# Patient Record
Sex: FEMALE | Race: WHITE | Employment: PART TIME | URBAN - METROPOLITAN AREA
[De-identification: names, ages, dates, MRNs, and addresses within clinical notes are randomized per-mention and may not be internally consistent; named-entity substitution may affect disease eponyms.]

---

## 2020-12-09 VITALS
SYSTOLIC BLOOD PRESSURE: 120 MMHG | HEART RATE: 80 BPM | OXYGEN SATURATION: 100 % | TEMPERATURE: 97.4 F | RESPIRATION RATE: 16 BRPM | DIASTOLIC BLOOD PRESSURE: 79 MMHG | HEIGHT: 62 IN | WEIGHT: 143 LBS | BODY MASS INDEX: 26.31 KG/M2

## 2020-12-10 ENCOUNTER — HOSPITAL ENCOUNTER (EMERGENCY)
Age: 25
Discharge: LEFT BEFORE DISCHARGE | End: 2020-12-10
Payer: COMMERCIAL

## 2020-12-11 ENCOUNTER — APPOINTMENT (OUTPATIENT)
Dept: ULTRASOUND IMAGING | Age: 25
End: 2020-12-11
Payer: COMMERCIAL

## 2020-12-11 ENCOUNTER — HOSPITAL ENCOUNTER (EMERGENCY)
Age: 25
Discharge: HOME | End: 2020-12-11
Attending: EMERGENCY MEDICINE
Payer: COMMERCIAL

## 2020-12-11 VITALS
RESPIRATION RATE: 18 BRPM | DIASTOLIC BLOOD PRESSURE: 65 MMHG | TEMPERATURE: 98.6 F | WEIGHT: 143 LBS | OXYGEN SATURATION: 100 % | SYSTOLIC BLOOD PRESSURE: 122 MMHG | BODY MASS INDEX: 26.31 KG/M2 | HEART RATE: 87 BPM | HEIGHT: 62 IN

## 2020-12-11 LAB
A/G RATIO: 1.6 (ref 1.1–2.2)
ABO/RH: NORMAL
ALBUMIN SERPL-MCNC: 4.9 G/DL (ref 3.4–5)
ALP BLD-CCNC: 60 U/L (ref 40–129)
ALT SERPL-CCNC: 14 U/L (ref 10–40)
ANION GAP SERPL CALCULATED.3IONS-SCNC: 9 MMOL/L (ref 3–16)
AST SERPL-CCNC: 13 U/L (ref 15–37)
BACTERIA WET PREP: NORMAL
BASOPHILS ABSOLUTE: 0.1 K/UL (ref 0–0.2)
BASOPHILS RELATIVE PERCENT: 0.6 %
BILIRUB SERPL-MCNC: 0.3 MG/DL (ref 0–1)
BILIRUBIN URINE: NEGATIVE
BLOOD, URINE: ABNORMAL
BUN BLDV-MCNC: 8 MG/DL (ref 7–20)
CALCIUM SERPL-MCNC: 9.7 MG/DL (ref 8.3–10.6)
CHLORIDE BLD-SCNC: 103 MMOL/L (ref 99–110)
CLARITY: CLEAR
CLUE CELLS: NORMAL
CO2: 25 MMOL/L (ref 21–32)
COLOR: YELLOW
CREAT SERPL-MCNC: 0.6 MG/DL (ref 0.6–1.1)
EOSINOPHILS ABSOLUTE: 0.4 K/UL (ref 0–0.6)
EOSINOPHILS RELATIVE PERCENT: 4 %
EPITHELIAL CELLS WET PREP: NORMAL
EPITHELIAL CELLS, UA: ABNORMAL /HPF (ref 0–5)
GFR AFRICAN AMERICAN: >60
GFR NON-AFRICAN AMERICAN: >60
GLOBULIN: 3 G/DL
GLUCOSE BLD-MCNC: 93 MG/DL (ref 70–99)
GLUCOSE URINE: NEGATIVE MG/DL
GONADOTROPIN, CHORIONIC (HCG) QUANT: 8203 MIU/ML
HCT VFR BLD CALC: 40.1 % (ref 36–48)
HEMOGLOBIN: 13.9 G/DL (ref 12–16)
KETONES, URINE: NEGATIVE MG/DL
LEUKOCYTE ESTERASE, URINE: ABNORMAL
LYMPHOCYTES ABSOLUTE: 2.2 K/UL (ref 1–5.1)
LYMPHOCYTES RELATIVE PERCENT: 20.1 %
MCH RBC QN AUTO: 30.1 PG (ref 26–34)
MCHC RBC AUTO-ENTMCNC: 34.7 G/DL (ref 31–36)
MCV RBC AUTO: 86.8 FL (ref 80–100)
MICROSCOPIC EXAMINATION: YES
MONOCYTES ABSOLUTE: 0.6 K/UL (ref 0–1.3)
MONOCYTES RELATIVE PERCENT: 5.4 %
NEUTROPHILS ABSOLUTE: 7.6 K/UL (ref 1.7–7.7)
NEUTROPHILS RELATIVE PERCENT: 69.9 %
NITRITE, URINE: NEGATIVE
PDW BLD-RTO: 12.7 % (ref 12.4–15.4)
PH UA: 7 (ref 5–8)
PLATELET # BLD: 329 K/UL (ref 135–450)
PMV BLD AUTO: 7.3 FL (ref 5–10.5)
POTASSIUM SERPL-SCNC: 3.7 MMOL/L (ref 3.5–5.1)
PROTEIN UA: 30 MG/DL
RBC # BLD: 4.62 M/UL (ref 4–5.2)
RBC UA: ABNORMAL /HPF (ref 0–4)
RBC WET PREP: NORMAL
SODIUM BLD-SCNC: 137 MMOL/L (ref 136–145)
SOURCE WET PREP: NORMAL
SPECIFIC GRAVITY UA: 1.02 (ref 1–1.03)
TOTAL PROTEIN: 7.9 G/DL (ref 6.4–8.2)
TRICHOMONAS PREP: NORMAL
URINE TYPE: ABNORMAL
UROBILINOGEN, URINE: 0.2 E.U./DL
WBC # BLD: 10.8 K/UL (ref 4–11)
WBC UA: ABNORMAL /HPF (ref 0–5)
WBC WET PREP: NORMAL
YEAST WET PREP: NORMAL

## 2020-12-11 PROCEDURE — 87491 CHLMYD TRACH DNA AMP PROBE: CPT

## 2020-12-11 PROCEDURE — 76817 TRANSVAGINAL US OBSTETRIC: CPT

## 2020-12-11 PROCEDURE — 87591 N.GONORRHOEAE DNA AMP PROB: CPT

## 2020-12-11 PROCEDURE — 87210 SMEAR WET MOUNT SALINE/INK: CPT

## 2020-12-11 PROCEDURE — 99283 EMERGENCY DEPT VISIT LOW MDM: CPT

## 2020-12-11 PROCEDURE — 81001 URINALYSIS AUTO W/SCOPE: CPT

## 2020-12-11 PROCEDURE — 85025 COMPLETE CBC W/AUTO DIFF WBC: CPT

## 2020-12-11 PROCEDURE — 80053 COMPREHEN METABOLIC PANEL: CPT

## 2020-12-11 PROCEDURE — 86900 BLOOD TYPING SEROLOGIC ABO: CPT

## 2020-12-11 PROCEDURE — 86901 BLOOD TYPING SEROLOGIC RH(D): CPT

## 2020-12-11 PROCEDURE — 84702 CHORIONIC GONADOTROPIN TEST: CPT

## 2020-12-11 ASSESSMENT — ENCOUNTER SYMPTOMS
TROUBLE SWALLOWING: 0
STRIDOR: 0
ABDOMINAL PAIN: 1
VOMITING: 0
RHINORRHEA: 0
NAUSEA: 0
SORE THROAT: 0
WHEEZING: 0
COUGH: 0
DIARRHEA: 0
SHORTNESS OF BREATH: 0
CHEST TIGHTNESS: 0

## 2020-12-11 ASSESSMENT — PAIN DESCRIPTION - DESCRIPTORS: DESCRIPTORS: CRAMPING

## 2020-12-11 ASSESSMENT — PAIN DESCRIPTION - LOCATION: LOCATION: ABDOMEN

## 2020-12-11 ASSESSMENT — PAIN DESCRIPTION - PAIN TYPE: TYPE: ACUTE PAIN

## 2020-12-11 ASSESSMENT — PAIN SCALES - GENERAL: PAINLEVEL_OUTOF10: 4

## 2020-12-11 NOTE — ED PROVIDER NOTES
[facility]  ED  EMERGENCYDEPARTMENT ENCOUNTER      Pt Name: Kelsey Allen  MRN: [id number]  [place] 1995  Date of evaluation: 2020  Sofia Aguilar MD    CHIEF COMPLAINT       Chief Complaint   Patient presents with    Vaginal Bleeding     Pt is 6.5 weeks pregnant bleeding started x 2 days ago. didn't bleed yesterday. states it started again today with cramping and blood clots. pt has a f/u appt with OB on Tuesday.  Abdominal Cramping         HISTORY OF PRESENT ILLNESS   (Location/Symptom, Timing/Onset,Context/Setting, Quality, Duration, Modifying Factors, Severity)  Note limiting factors. Kelsey Allen is a 31 y.o. female  @ LMP 10/25 (who presents to the emergency department for vaginal bleed. Patient states she started having vaginal bleeding on Wednesday, though it wasn't a lot, did not have any bleeding yesterday and noticed more bleeding today with clots, soaking through the pad she has on. States she called her ob gyn and was told to go to the ED as she has not yet established care with their office (has first appointment for Tuesday). Denies nausea, vomiting, lightheadedness, dizziness. Reports abdominal cramping. Denies vaginal discharge, dysuria, urinary frequency. HPI    Nursing Notes were reviewed. REVIEW OF SYSTEMS    (2-9 systems for level 4, 10 or more for level 5)     Review of Systems   Constitutional: Negative for activity change, appetite change, diaphoresis and fever. HENT: Negative for congestion, rhinorrhea, sore throat and trouble swallowing. Respiratory: Negative for cough, chest tightness, shortness of breath, wheezing and stridor. Cardiovascular: Negative for chest pain and palpitations. Gastrointestinal: Positive for abdominal pain. Negative for diarrhea, nausea and vomiting. Genitourinary: Positive for vaginal bleeding. Negative for dysuria, flank pain and vaginal discharge. Skin: Negative for rash and wound. Neurological: Negative for dizziness, light-headedness, numbness and headaches. Except as noted above the remainder of the review of systems was reviewedand negative. PAST MEDICAL HISTORY   History reviewed. No pertinent past medical history. SURGICAL HISTORY     History reviewed. No pertinent surgical history. CURRENT MEDICATIONS       There are no discharge medications for this patient. ALLERGIES     Patient has no known allergies. FAMILY HISTORY     History reviewed. No pertinent family history.        SOCIAL HISTORY       Social History     Socioeconomic History    Marital status: Unknown     Spouse name: None    Number of children: None    Years of education: None    Highest education level: None   Occupational History    None   Social Needs    Financial resource strain: None    Food insecurity     Worry: None     Inability: None    Transportation needs     Medical: None     Non-medical: None   Tobacco Use    Smoking status: Never Smoker    Smokeless tobacco: Never Used   Substance and Sexual Activity    Alcohol use: Yes     Comment: rarely    Drug use: Never    Sexual activity: Yes     Partners: Male   Lifestyle    Physical activity     Days per week: None     Minutes per session: None    Stress: None   Relationships    Social connections     Talks on phone: None     Gets together: None     Attends Advent service: None     Active member of club or organization: None     Attends meetings of clubs or organizations: None     Relationship status: None    Intimate partner violence     Fear of current or ex partner: None     Emotionally abused: None     Physically abused: None     Forced sexual activity: None   Other Topics Concern    None   Social History Narrative    None       SCREENINGS             PHYSICAL EXAM    (up to 7 for level 4, 8 ormore for level 5)     ED Triage Vitals   BP Temp Temp Source Pulse Resp SpO2 Height Weight   12/11/20 1351 12/11/20 1351 12/11/20 1351 12/11/20 1337 12/11/20 1351 12/11/20 1337 12/11/20 1351 12/11/20 1351   110/70 98.2 °F (36.8 °C) Oral 94 14 98 % 5' 2\" (1.575 m) 143 lb (64.9 kg)       Physical Exam  Exam conducted with a chaperone present. Constitutional:       General: She is not in acute distress. Appearance: Normal appearance. She is well-developed. She is not ill-appearing or toxic-appearing. Comments: Sitting in bed comfortably, speaking in full sentences, following verbal commands appropriately. Not in acute distress     HENT:      Head: Normocephalic and atraumatic. Eyes:      Conjunctiva/sclera: Conjunctivae normal.      Pupils: Pupils are equal, round, and reactive to light. Neck:      Musculoskeletal: Normal range of motion and neck supple. Cardiovascular:      Rate and Rhythm: Normal rate and regular rhythm. Heart sounds: Normal heart sounds. No murmur. No friction rub. No gallop. Pulmonary:      Effort: Pulmonary effort is normal. No respiratory distress. Breath sounds: Normal breath sounds. No decreased breath sounds, wheezing, rhonchi or rales. Abdominal:      General: Bowel sounds are normal. There is no distension. Palpations: Abdomen is soft. Tenderness: There is generalized abdominal tenderness. There is no guarding or rebound. Genitourinary:     Vagina: Bleeding present. Cervix: Cervical bleeding present. Comments: os fingertip open  Musculoskeletal: Normal range of motion. General: No tenderness or deformity. Skin:     General: Skin is warm and dry. Findings: No rash. Neurological:      Mental Status: She is alert and oriented to person, place, and time. GCS: GCS eye subscore is 4. GCS verbal subscore is 5. GCS motor subscore is 6. Psychiatric:         Behavior: Behavior is cooperative.          DIAGNOSTIC RESULTS     EKG: All EKG's are interpreted by the Emergency Department Physicianwho either signs or Co-signs this chart in the absence of a cardiologist.      RADIOLOGY:   Non-plain film images such as CT, Ultrasound and MRI are read by the radiologist. Plain radiographic images are visualized and preliminarily interpreted by the emergency physician with the below findings:      Interpretation per the Radiologist below, if available at the time of this note:    US OB TRANSVAGINAL   Preliminary Result   1. Single early intrauterine pregnancy at approximately 6 weeks, 0 days with   an PRAVEEN of 08/06/2021. A fetal pole is identified. A fetal heart rate is not   identified at this time. Close clinical follow up and serial beta HCG   measurements are recommended. 2. Normal color Doppler evaluation of the ovaries. ED BEDSIDE ULTRASOUND:   Performed by ED Physician - none    LABS:  Labs Reviewed   URINALYSIS - Abnormal; Notable for the following components:       Result Value    Blood, Urine LARGE (*)     Protein, UA 30 (*)     Leukocyte Esterase, Urine TRACE (*)     All other components within normal limits    Narrative:     Performed at:  [place] [address] [employer]   Phone [phone]   COMPREHENSIVE METABOLIC PANEL - Abnormal; Notable for the following components:    AST 13 (*)     All other components within normal limits    Narrative:     Performed at:  [place] [address] [employer]   Phone [phone]   MICROSCOPIC URINALYSIS - Abnormal; Notable for the following components:    RBC, UA 11-20 (*)     All other components within normal limits    Narrative:     Performed at:  [address], [facility] [employer]   Phone [phone], GENITAL    Narrative:     Performed at:  [place] [address] [employer]   Phone [phone]   C. TRACHOMATIS N.GONORRHOEAE DNA   CBC WITH AUTO DIFFERENTIAL    Narrative: Performed at:  [facility]  [address] [place], [place] [address], [place] [employer]   Phone [phone]   HCG, QUANTITATIVE, PREGNANCY    Narrative:     Performed at:  [facility]) [address],  [place], [place] [email or web address] [place]   Phone [phone]   ABO/RH    Narrative:     Performed at:  [address],  [place], [place] [employer]   Phone [phone]       All other labs were within normal range ornot returned as of this dictation. EMERGENCY DEPARTMENT COURSE and DIFFERENTIAL DIAGNOSIS/MDM:   Vitals:    Vitals:    20 1337 20 1351 20 1737 20 1853   BP:  110/70 120/78 122/65   Pulse: 94 81 76 87   Resp:  14 14 18   Temp:  98.2 °F (36.8 °C)  98.6 °F (37 °C)   TempSrc:  Oral  Oral   SpO2: 98% 100% 100% 100%   Weight:  143 lb (64.9 kg)     Height:  5' 2\" (1.575 m)           MDM    ED COURSE/MDM    -Umang Elliott is a 31 y.o. female  who presents ED for vaginal bleed in the setting of pregnancy. Patient's LMP is 10/25/2020 (6 weeks and 5 days). -Upon arrival patient was afebrile with stable vitals, BP of 110/70.  -Lab work was significant for hCG quant of 8200.  -Vaginal ultrasound: Single early intrauterine pregnancy at approximately 6 weeks 0 days with an PRAVEEN of 2021. Fetal pole is identified. Fetal heart rate is not identified at this time. Close clinical follow-up and serial beta hCG measurements are recommended. Normal color Doppler evaluation of the ovaries. -wet Prep: Negative negative  -Labs and imaging reviewed and results discussed with patient. discharge home, with order to get a repeat beta in 2 days. Patient has a follow-up appointment on Tuesday at which point she will likely get another beta and possible repeat ultrasound. Directed her on pelvic rest and instructions on return for any new or worsening symptoms.    Patient in agreement withplan, verbally confirm understanding

## 2020-12-13 ENCOUNTER — HOSPITAL ENCOUNTER (OUTPATIENT)
Age: 25
Discharge: HOME | End: 2020-12-13
Payer: COMMERCIAL

## 2020-12-13 LAB — GONADOTROPIN, CHORIONIC (HCG) QUANT: 7603 MIU/ML

## 2020-12-13 PROCEDURE — 84702 CHORIONIC GONADOTROPIN TEST: CPT

## 2020-12-13 PROCEDURE — 36415 COLL VENOUS BLD VENIPUNCTURE: CPT

## 2020-12-14 ENCOUNTER — TELEPHONE (OUTPATIENT)
Dept: OBGYN CLINIC | Age: 25
End: 2020-12-14

## 2020-12-14 LAB
C TRACH DNA GENITAL QL NAA+PROBE: NEGATIVE
N. GONORRHOEAE DNA: NEGATIVE

## 2020-12-15 ENCOUNTER — OFFICE VISIT (OUTPATIENT)
Dept: OBGYN CLINIC | Age: 25
End: 2020-12-15
Payer: COMMERCIAL

## 2020-12-15 VITALS
WEIGHT: 142.6 LBS | DIASTOLIC BLOOD PRESSURE: 60 MMHG | BODY MASS INDEX: 26.08 KG/M2 | TEMPERATURE: 97.9 F | SYSTOLIC BLOOD PRESSURE: 116 MMHG | HEART RATE: 90 BPM

## 2020-12-15 LAB
ABDOMINAL CIRCUMFERENCE: NORMAL
BIPARIETAL DIAMETER: NORMAL
ESTIMATED FETAL WEIGHT: NORMAL
FEMORAL DIAMETER: NORMAL
HC/AC: NORMAL
HEAD CIRCUMFERENCE: NORMAL

## 2020-12-15 PROCEDURE — 1036F TOBACCO NON-USER: CPT | Performed by: OBSTETRICS & GYNECOLOGY

## 2020-12-15 PROCEDURE — 99203 OFFICE O/P NEW LOW 30 MIN: CPT | Performed by: OBSTETRICS & GYNECOLOGY

## 2020-12-15 PROCEDURE — G8427 DOCREV CUR MEDS BY ELIG CLIN: HCPCS | Performed by: OBSTETRICS & GYNECOLOGY

## 2020-12-15 PROCEDURE — G8419 CALC BMI OUT NRM PARAM NOF/U: HCPCS | Performed by: OBSTETRICS & GYNECOLOGY

## 2020-12-15 PROCEDURE — G8484 FLU IMMUNIZE NO ADMIN: HCPCS | Performed by: OBSTETRICS & GYNECOLOGY

## 2020-12-15 PROCEDURE — 76817 TRANSVAGINAL US OBSTETRIC: CPT | Performed by: OBSTETRICS & GYNECOLOGY

## 2020-12-15 RX ORDER — HYDROCODONE BITARTRATE AND ACETAMINOPHEN 5; 325 MG/1; MG/1
1 TABLET ORAL EVERY 6 HOURS PRN
Qty: 4 TABLET | Refills: 0 | Status: SHIPPED | OUTPATIENT
Start: 2020-12-15 | End: 2020-12-16

## 2020-12-15 RX ORDER — MISOPROSTOL 200 UG/1
800 TABLET ORAL ONCE
Qty: 8 TABLET | Refills: 0 | Status: SHIPPED | OUTPATIENT
Start: 2020-12-15 | End: 2021-04-13 | Stop reason: ALTCHOICE

## 2020-12-15 NOTE — PROGRESS NOTES
New GYN Visit      CC:   Chief Complaint   Patient presents with    Amenorrhea     Pos home preg test lmp 10/25/20       HPI:  31 y.o. [id number] presents for ER follow-up. Was seen in [facility] 12/10/2020 for vaginal bleeding early in pregnancy. Had a small blood clot, left without being seen. Came to [facility] ER 2020 after the bleeding got heavier. At it's heaviest was saturating a pad in 2 hours. Now just changing every 3-4 hours and they are not completely saturated, no big clots. Having some lower abdominal cramping. No fevers, chills, nausea or vomiting,    LMP 10/25/2020 --> Had a Mirena removed in September, had one period and then got pregnant so unsure if cycles were regular. Review of Systems:   Review of Systems   Constitutional: Negative for chills and fever. Respiratory: Negative for shortness of breath. Cardiovascular: Negative for chest pain. Gastrointestinal: Positive for abdominal pain. Negative for constipation, diarrhea, nausea and vomiting. Genitourinary: Positive for menstrual problem and vaginal bleeding. Negative for difficulty urinating, dysuria and vaginal discharge. Neurological: Negative for dizziness, light-headedness and headaches. Obstetric History  OB History    Para Term  AB Living   3 2 2     2   SAB TAB Ectopic Molar Multiple Live Births             2      # Outcome Date GA Lbr Pranav/2nd Weight Sex Delivery Anes PTL Lv   3 Current            2 Term 06/20/15   7 lb 2.4 oz (3.243 kg) M Vag-Spont   LEWIS   1 Term 10/23/13   7 lb 1.9 oz (3.23 kg) M Vag-Spont   LEWIS       Gynecologic History  Menstrual History:   LMP: 10/25/2020   Menstrual pattern: see above  Sexual History:   Contraception: annabel   Currently is sexually active   Denies history of STIs   no sexual problems  Pap History:   Last pap smear: 2020, normal per pt report   History of abnormal pap smears: denies    Medical History:  History reviewed.  No pertinent past medical history. Surgical History:  History reviewed. No pertinent surgical history. Medications:  Current Outpatient Medications   Medication Sig Dispense Refill    Prenatal Multivit-Min-Fe-FA (PRENATAL 1 + IRON PO) Take 1 tablet by mouth daily       No current facility-administered medications for this visit.         Allergies:  No Known Allergies    Social History:  Social History     Socioeconomic History    Marital status:      Spouse name: None    Number of children: None    Years of education: None    Highest education level: None   Occupational History    None   Social Needs    Financial resource strain: None    Food insecurity     Worry: None     Inability: None    Transportation needs     Medical: None     Non-medical: None   Tobacco Use    Smoking status: Never Smoker    Smokeless tobacco: Never Used   Substance and Sexual Activity    Alcohol use: Yes     Comment: rarely    Drug use: Never    Sexual activity: Yes     Partners: Male   Lifestyle    Physical activity     Days per week: None     Minutes per session: None    Stress: None   Relationships    Social connections     Talks on phone: None     Gets together: None     Attends Church service: None     Active member of club or organization: None     Attends meetings of clubs or organizations: None     Relationship status: None    Intimate partner violence     Fear of current or ex partner: None     Emotionally abused: None     Physically abused: None     Forced sexual activity: None   Other Topics Concern    None   Social History Narrative    None       Family History:  Family History   Problem Relation Age of Onset    Cancer Paternal Grandfather     Diabetes Maternal Grandmother        Objective:  /60 (Site: Left Upper Arm, Position: Sitting, Cuff Size: Medium Adult)   Pulse 90   Temp 97.9 °F (36.6 °C) (Infrared)   Wt 142 lb 9.6 oz (64.7 kg)   LMP 10/25/2020 (Exact Date)   BMI 26.08 kg/m²     Exam:  Physical Exam  Constitutional:       Appearance: Normal appearance. HENT:      Head: Normocephalic. Cardiovascular:      Rate and Rhythm: Normal rate. Pulmonary:      Effort: Pulmonary effort is normal. No respiratory distress. Abdominal:      General: Abdomen is flat. There is no distension. Palpations: Abdomen is soft. Tenderness: There is no abdominal tenderness. There is no guarding or rebound. Genitourinary:     Comments: Pelvic exam: VULVA: normal appearing vulva with no masses, tenderness or lesions, VAGINA: normal appearing vagina with normal color and discharge, no lesions, small amount of blood in vault, CERVIX: normal cervix, small amount of bloody discharge at os, on BME external os dilated 1cm, internal closed, UTERUS: uterus is normal size, shape, consistency and nontender, ADNEXA: normal adnexa in size, nontender and no masses. Skin:     General: Skin is warm and dry. Neurological:      General: No focal deficit present. Mental Status: She is alert. Psychiatric:         Mood and Affect: Mood normal.       Labs: BHC () -> 7603 ()  GCCT neg  O+  Hgb 13.9    Ultrasound:  Impression   OBSTETRIC ULTRASOUND--1ST TRIMESTER       DATE: 12/15/20       PHYSICIAN: Ksenia Van M.D.        SONOGRAPHER: Zac Pedraza       INDICATION: follow up        COMPARISION: 20       TYPE OF SCAN:  vaginal       FINDINGS:         The cul de sac is normal.  The cervix is normal.  The uterus is gravid. No uterine anomalies are evident.        The right ovary is present. The right ovary measures 2.70 cm x 2.51 cm x 1.64 cm.         The left ovary is present. The left ovary measures 3.99 cm x 2.28 cm x 2.42 cm.         There is a single intrauterine pregnancy identified.  A fetal pole is noted with a CRL measuring 0.33 cm, consistent with gestational age of 6weeks and [de-identified]. There is no change in fetal size compared to prior imaging.  Yolk sac is present and measures 0.64 cm.   Fetal cardiac activity is absent.        IMPRESSION:     Single IUP without cardiac activity or fetal growth.       Imaging is limited secondary to bowel gas. Patient is well aware of the limitations of ultrasound in the detection of anomalies.             Assessment/Plan:  31 y.o. [id number] presenting for ER follow-up:    1. Missed   Patient with single IUP as above, no interval growth since US  and still with absence of cardiac activity. Given this along with downtrending bHCG discussed diagnosis of likely missed AB. Options reviewed including expectant, medical, and surgical management. Pt desires medication. Reviewed use of cytotec, bleeding and pain precautions, and all questions answered. Rx for norco for pain given as well.   - Rh+, no rhogam indicated  - f/u in 2 weeks if has bleeding after cytotec, otherwise sooner to discuss alternate management options    Dispo: 2 weeks or sooner pending response to medication  Ismael Kolb MD

## 2020-12-15 NOTE — RESULT ENCOUNTER NOTE
Please inform patient of need to make follow up appointment with OB/GYN based on repeat beta-HCG results this week if possible. You can let her know repeat lab was concerning.

## 2020-12-16 ASSESSMENT — ENCOUNTER SYMPTOMS
NAUSEA: 0
VOMITING: 0
ABDOMINAL PAIN: 1
SHORTNESS OF BREATH: 0
DIARRHEA: 0
CONSTIPATION: 0

## 2020-12-28 ENCOUNTER — TELEPHONE (OUTPATIENT)
Dept: OBGYN CLINIC | Age: 25
End: 2020-12-28

## 2020-12-29 ENCOUNTER — OFFICE VISIT (OUTPATIENT)
Dept: OBGYN CLINIC | Age: 25
End: 2020-12-29
Payer: COMMERCIAL

## 2020-12-29 VITALS
BODY MASS INDEX: 26.34 KG/M2 | SYSTOLIC BLOOD PRESSURE: 128 MMHG | HEART RATE: 90 BPM | DIASTOLIC BLOOD PRESSURE: 74 MMHG | WEIGHT: 144 LBS | TEMPERATURE: 98.5 F

## 2020-12-29 LAB
CONTROL: NORMAL
PREGNANCY TEST URINE, POC: NEGATIVE

## 2020-12-29 PROCEDURE — G8484 FLU IMMUNIZE NO ADMIN: HCPCS | Performed by: OBSTETRICS & GYNECOLOGY

## 2020-12-29 PROCEDURE — 1036F TOBACCO NON-USER: CPT | Performed by: OBSTETRICS & GYNECOLOGY

## 2020-12-29 PROCEDURE — 81025 URINE PREGNANCY TEST: CPT | Performed by: OBSTETRICS & GYNECOLOGY

## 2020-12-29 PROCEDURE — 99212 OFFICE O/P EST SF 10 MIN: CPT | Performed by: OBSTETRICS & GYNECOLOGY

## 2020-12-29 PROCEDURE — G8419 CALC BMI OUT NRM PARAM NOF/U: HCPCS | Performed by: OBSTETRICS & GYNECOLOGY

## 2020-12-29 PROCEDURE — G8427 DOCREV CUR MEDS BY ELIG CLIN: HCPCS | Performed by: OBSTETRICS & GYNECOLOGY

## 2020-12-29 NOTE — PROGRESS NOTES
Return Gyn Office Visit    CC:   Chief Complaint   Patient presents with    Follow-up       HPI:  31 y.o. E3P6244 who presents to office for follow-up. Was seen on 12/15 with missed AB. Took cytotec that day, worked that night after 1 dose, says she passed a lot of tissue and stopped bleeding completely around 20. No bleeding or cramping since then. Denies any chest pain, shortness of breadth. Doing well overall emotionally as well. Objective:  /74 (Site: Left Upper Arm, Position: Sitting, Cuff Size: Medium Adult)   Pulse 90   Temp 98.5 °F (36.9 °C) (Infrared)   Wt 144 lb (65.3 kg)   LMP 10/25/2020 (Exact Date)   BMI 26.34 kg/m²   General: Alert, well appearing, no acute distress  CV: well perfused, RRR  Resp: nonlabored, CTAB  Abdomen: Soft, nontender, nondistended   Ext: no LE edema or tenderness    Labs:   : O+/ab-  : UPT neg      Assessment/Plan  1. SAB (spontaneous )  Patient with missed ab now s/p cytotec and passage of POC. UPT negative today, says bleeding has resolved and no residual pain.   - Plan to wait for 1 regular menstrual cycle and then OK to attempt to conceive again  - Rh+, no rhogam indicated    Dispo: PRN or for annual exam  Aure Cho MD

## 2021-03-08 ENCOUNTER — TELEPHONE (OUTPATIENT)
Dept: OBGYN CLINIC | Age: 26
End: 2021-03-08

## 2021-03-09 ENCOUNTER — OFFICE VISIT (OUTPATIENT)
Dept: OBGYN CLINIC | Age: 26
End: 2021-03-09
Payer: COMMERCIAL

## 2021-03-09 VITALS
BODY MASS INDEX: 26.01 KG/M2 | HEART RATE: 95 BPM | SYSTOLIC BLOOD PRESSURE: 110 MMHG | WEIGHT: 142.2 LBS | TEMPERATURE: 98.3 F | DIASTOLIC BLOOD PRESSURE: 74 MMHG

## 2021-03-09 DIAGNOSIS — N91.2 AMENORRHEA: Primary | ICD-10-CM

## 2021-03-09 DIAGNOSIS — Z20.2 POSSIBLE EXPOSURE TO STD: ICD-10-CM

## 2021-03-09 LAB
BACTERIA: ABNORMAL /HPF
BILIRUBIN URINE: NEGATIVE
BLOOD, URINE: NEGATIVE
CLARITY: CLEAR
COLOR: YELLOW
CONTROL: ABNORMAL
CRL: NORMAL
EPITHELIAL CELLS, UA: 2 /HPF (ref 0–5)
GLUCOSE URINE: NEGATIVE MG/DL
HYALINE CASTS: 1 /LPF (ref 0–8)
KETONES, URINE: NEGATIVE MG/DL
LEUKOCYTE ESTERASE, URINE: ABNORMAL
MICROSCOPIC EXAMINATION: YES
NITRITE, URINE: NEGATIVE
PH UA: 6 (ref 5–8)
PREGNANCY TEST URINE, POC: POSITIVE
PROTEIN UA: NEGATIVE MG/DL
RBC UA: 2 /HPF (ref 0–4)
SAC DIAMETER: NORMAL
SPECIFIC GRAVITY UA: 1.02 (ref 1–1.03)
URINE TYPE: ABNORMAL
UROBILINOGEN, URINE: 0.2 E.U./DL
WBC UA: 4 /HPF (ref 0–5)

## 2021-03-09 PROCEDURE — 76801 OB US < 14 WKS SINGLE FETUS: CPT | Performed by: OBSTETRICS & GYNECOLOGY

## 2021-03-09 PROCEDURE — G8419 CALC BMI OUT NRM PARAM NOF/U: HCPCS | Performed by: OBSTETRICS & GYNECOLOGY

## 2021-03-09 PROCEDURE — 1036F TOBACCO NON-USER: CPT | Performed by: OBSTETRICS & GYNECOLOGY

## 2021-03-09 PROCEDURE — 99213 OFFICE O/P EST LOW 20 MIN: CPT | Performed by: OBSTETRICS & GYNECOLOGY

## 2021-03-09 PROCEDURE — 81025 URINE PREGNANCY TEST: CPT | Performed by: OBSTETRICS & GYNECOLOGY

## 2021-03-09 PROCEDURE — G8484 FLU IMMUNIZE NO ADMIN: HCPCS | Performed by: OBSTETRICS & GYNECOLOGY

## 2021-03-09 PROCEDURE — G8427 DOCREV CUR MEDS BY ELIG CLIN: HCPCS | Performed by: OBSTETRICS & GYNECOLOGY

## 2021-03-09 NOTE — PROGRESS NOTES
Return Gyn Office Visit    CC:   Chief Complaint   Patient presents with    Amenorrhea     lmp 21       HPI:  31 y.o. [id number] who presents to office for amenorrhea. LMP 21, had a miscarriage in 2020 and had 1 period after that and got pregnant with next cycle. Has been feeling well, had some light spotting in the beginning of February. Feeling very nauseated since then, no bleeding since early February. Has also been feeling very tired. Using preggy pops, those seem to help. Declines additional medication at this time. OBHx:  OB History    Para Term  AB Living   3 2 2   1 2   SAB TAB Ectopic Molar Multiple Live Births   1         2      # Outcome Date GA Lbr Pranav/2nd Weight Sex Delivery Anes PTL Lv   3 SAB            2 Term 06/20/15   7 lb 2.4 oz (3.243 kg) M Vag-Spont   LEWIS   1 Term 10/23/13   7 lb 1.9 oz (3.23 kg) M Vag-Spont   LEWIS       PMH:  History reviewed. No pertinent past medical history. PSH:  History reviewed. No pertinent surgical history. Current Outpatient Medications on File Prior to Visit   Medication Sig Dispense Refill    Prenatal Multivit-Min-Fe-FA (PRENATAL 1 + IRON PO) Take 1 tablet by mouth daily      miSOPROStol (CYTOTEC) 200 MCG tablet Place 4 tablets vaginally once for 1 dose . If no bleeding repeat after 24 hours. 8 tablet 0     No current facility-administered medications on file prior to visit. Objective:  /74 (Site: Right Upper Arm, Position: Sitting, Cuff Size: Medium Adult)   Pulse 95   Temp 98.3 °F (36.8 °C) (Infrared)   Wt 142 lb 3.2 oz (64.5 kg)   LMP 2021 (Exact Date)   BMI 26.01 kg/m²   Physical Exam  Constitutional:       Appearance: Normal appearance. HENT:      Head: Normocephalic. Cardiovascular:      Rate and Rhythm: Normal rate and regular rhythm. Pulmonary:      Effort: Pulmonary effort is normal. No respiratory distress. Abdominal:      General: Abdomen is flat. Palpations: Abdomen is soft. Amenorrhea  Patient with amenorrhea, US today shows single IUP with cardiac activity at 8+4 with final PRAVEEN 10/15/2021 by L=8wk US.  - Discussed routine prenatal care, early pregnancy precautions, continued use of PNV  - Below labs sent  - Briefly reviewed options for genetic screening including cffDNA, 1st trimester screen with NT/SAMIRA-A, and MQS in 2nd trimester. Plan for [id number] at next visit. - discussed options for treatment of n/v of pregnancy, pt to call if desires rx for additional medications before next appointment    - C.trachomatis N.gonorrhoeae DNA  - VAGINAL PATHOGENS PROBE *A  - Culture, Urine  - URINALYSIS WITH MICROSCOPIC  - POCT urine pregnancy    2.  Possible exposure to STD  - C.trachomatis N.gonorrhoeae DNA  - VAGINAL PATHOGENS PROBE *A      Dispo: 4 weeks for IPV  Gwendolyn Vuong MD

## 2021-03-10 LAB
C TRACH DNA GENITAL QL NAA+PROBE: NEGATIVE
CANDIDA SPECIES, DNA PROBE: ABNORMAL
GARDNERELLA VAGINALIS, DNA PROBE: ABNORMAL
N. GONORRHOEAE DNA: NEGATIVE
TRICHOMONAS VAGINALIS DNA: ABNORMAL
URINE CULTURE, ROUTINE: NORMAL

## 2021-03-11 RX ORDER — METRONIDAZOLE 7.5 MG/G
1 GEL VAGINAL DAILY
Qty: 1 TUBE | Refills: 0 | Status: SHIPPED | OUTPATIENT
Start: 2021-03-11 | End: 2021-03-16

## 2021-04-12 ENCOUNTER — TELEPHONE (OUTPATIENT)
Dept: OBGYN CLINIC | Age: 26
End: 2021-04-12

## 2021-04-13 ENCOUNTER — INITIAL PRENATAL (OUTPATIENT)
Dept: OBGYN CLINIC | Age: 26
End: 2021-04-13
Payer: COMMERCIAL

## 2021-04-13 VITALS
BODY MASS INDEX: 26.52 KG/M2 | DIASTOLIC BLOOD PRESSURE: 70 MMHG | SYSTOLIC BLOOD PRESSURE: 112 MMHG | HEART RATE: 88 BPM | WEIGHT: 145 LBS

## 2021-04-13 DIAGNOSIS — Z34.91 PRENATAL CARE IN FIRST TRIMESTER: Primary | ICD-10-CM

## 2021-04-13 PROCEDURE — G8427 DOCREV CUR MEDS BY ELIG CLIN: HCPCS | Performed by: OBSTETRICS & GYNECOLOGY

## 2021-04-13 PROCEDURE — 1036F TOBACCO NON-USER: CPT | Performed by: OBSTETRICS & GYNECOLOGY

## 2021-04-13 PROCEDURE — 36415 COLL VENOUS BLD VENIPUNCTURE: CPT | Performed by: OBSTETRICS & GYNECOLOGY

## 2021-04-13 PROCEDURE — G8419 CALC BMI OUT NRM PARAM NOF/U: HCPCS | Performed by: OBSTETRICS & GYNECOLOGY

## 2021-04-13 PROCEDURE — 99213 OFFICE O/P EST LOW 20 MIN: CPT | Performed by: OBSTETRICS & GYNECOLOGY

## 2021-04-13 NOTE — PROGRESS NOTES
Temp-98F infrared  Maternal emotional well being screening form completed and reviewed with patient. Current score is 5. Patient given referral to [address] [place] [address] ([phone]):  No

## 2021-04-13 NOTE — PROGRESS NOTES
Initial OB Office Visit    CC:   Chief Complaint   Patient presents with    Initial Prenatal Visit       HPI:  Pt seen and examined. No concerns/complaints. Has been doing well overall. Feeling tired. Denies VB, no cramps. Some mild discharge. No fetal movmenet yet. No nausea or vomiting. +heartburn. Maternal wellness questionnaire reviewed - no concerns today. Score 5.      Objective:  /70   Pulse 88   Wt 145 lb (65.8 kg)   LMP 01/08/2021 (Exact Date)   BMI 26.52 kg/m²   Gen: AO, NAD  Abd: Soft, NT  FHT: 157    Assessment/Plan:  31 y.o. [id number] at 13w4d (Estimated Date of Delivery: 10/15/21) presents for ROLO appointment:     Problem List Items Addressed This Visit        Gynecology/Obstetric Problems    Prenatal care in first trimester - Primary     - FWB: reassuring by janie today  - Genetic screening: [id number] sent today  - Anatomy scan: plan at 20 weeks  - Tdap: 28 weeks  - PNL: sent today, GCCT/trich neg, UCx no growth         Relevant Orders    Drug Screen Multi Urine With Bup    HIV-1 AND HIV-2 ANTIBODIES    RUBELLA ANTIBODY, IGG    Syphilis Antibody Cascading Reflex    HEPATITIS B SURFACE ANTIGEN    CBC WITH AUTO DIFFERENTIAL    VARICELLA ZOSTER ANTIBODY, IGG    TYPE AND SCREEN          Dispo: RTC in 4 weeks  Amairani Wilhelm MD

## 2021-04-14 LAB
ABO/RH: NORMAL
AMPHETAMINE SCREEN, URINE: NORMAL
ANTIBODY SCREEN: NORMAL
BARBITURATE SCREEN URINE: NORMAL
BASOPHILS ABSOLUTE: 0.1 K/UL (ref 0–0.2)
BASOPHILS RELATIVE PERCENT: 1.1 %
BENZODIAZEPINE SCREEN, URINE: NORMAL
BUPRENORPHINE URINE: NORMAL
CANNABINOID SCREEN URINE: NORMAL
COCAINE METABOLITE SCREEN URINE: NORMAL
EOSINOPHILS ABSOLUTE: 0.3 K/UL (ref 0–0.6)
EOSINOPHILS RELATIVE PERCENT: 4.3 %
HCT VFR BLD CALC: 37.9 % (ref 36–48)
HEMOGLOBIN: 13.1 G/DL (ref 12–16)
HEPATITIS B SURFACE ANTIGEN INTERPRETATION: NORMAL
HIV AG/AB: NORMAL
HIV ANTIGEN: NORMAL
HIV-1 ANTIBODY: NORMAL
HIV-2 AB: NORMAL
LYMPHOCYTES ABSOLUTE: 1.6 K/UL (ref 1–5.1)
LYMPHOCYTES RELATIVE PERCENT: 19.7 %
Lab: NORMAL
MCH RBC QN AUTO: 30.1 PG (ref 26–34)
MCHC RBC AUTO-ENTMCNC: 34.5 G/DL (ref 31–36)
MCV RBC AUTO: 87.1 FL (ref 80–100)
METHADONE SCREEN, URINE: NORMAL
MONOCYTES ABSOLUTE: 0.4 K/UL (ref 0–1.3)
MONOCYTES RELATIVE PERCENT: 5.5 %
NEUTROPHILS ABSOLUTE: 5.5 K/UL (ref 1.7–7.7)
NEUTROPHILS RELATIVE PERCENT: 69.4 %
OPIATE SCREEN URINE: NORMAL
OXYCODONE URINE: NORMAL
PDW BLD-RTO: 13.5 % (ref 12.4–15.4)
PH UA: 7
PHENCYCLIDINE SCREEN URINE: NORMAL
PLATELET # BLD: 250 K/UL (ref 135–450)
PMV BLD AUTO: 8.3 FL (ref 5–10.5)
PROPOXYPHENE SCREEN: NORMAL
RBC # BLD: 4.35 M/UL (ref 4–5.2)
RUBELLA ANTIBODY IGG: 20.5 IU/ML
TOTAL SYPHILLIS IGG/IGM: NORMAL
VARICELLA-ZOSTER VIRUS AB, IGG: NORMAL
WBC # BLD: 7.9 K/UL (ref 4–11)

## 2021-05-12 ENCOUNTER — ROUTINE PRENATAL (OUTPATIENT)
Dept: OBGYN CLINIC | Age: 26
End: 2021-05-12
Payer: COMMERCIAL

## 2021-05-12 VITALS
WEIGHT: 144.4 LBS | SYSTOLIC BLOOD PRESSURE: 120 MMHG | DIASTOLIC BLOOD PRESSURE: 70 MMHG | HEART RATE: 90 BPM | BODY MASS INDEX: 26.41 KG/M2

## 2021-05-12 DIAGNOSIS — Z34.92 PRENATAL CARE IN SECOND TRIMESTER: Primary | ICD-10-CM

## 2021-05-12 PROCEDURE — G8427 DOCREV CUR MEDS BY ELIG CLIN: HCPCS | Performed by: OBSTETRICS & GYNECOLOGY

## 2021-05-12 PROCEDURE — 99213 OFFICE O/P EST LOW 20 MIN: CPT | Performed by: OBSTETRICS & GYNECOLOGY

## 2021-05-12 PROCEDURE — 1036F TOBACCO NON-USER: CPT | Performed by: OBSTETRICS & GYNECOLOGY

## 2021-05-12 PROCEDURE — G8419 CALC BMI OUT NRM PARAM NOF/U: HCPCS | Performed by: OBSTETRICS & GYNECOLOGY

## 2021-05-12 NOTE — PROGRESS NOTES
Return OB Office Visit    CC:   Chief Complaint   Patient presents with    Routine Prenatal Visit       HPI:  Pt seen and examined. No concerns/complaints. Denies VB, LOF, cramps. No fetal movement yet. Feels like things have been going pretty well so far this pregnancy. Maternal wellness questionnaire reviewed - no concerns today. Score 6.      Objective:  /70   Pulse 90   Wt 144 lb 6.4 oz (65.5 kg)   LMP 01/08/2021 (Exact Date)   BMI 26.41 kg/m²   Gen: AO, NAD  Abd: Soft, NT  FHT: 470  FH: below umbilicus    Assessment/Plan:  31 y.o. [id number] at 17w5d (Estimated Date of Delivery: 10/15/21) presents for ROLO appointment:     Problem List Items Addressed This Visit        Gynecology/Obstetric Problems    Prenatal care in second trimester     - FWB: reassuring by janie today  - Genetic screening: [id number] negative, femal fetus  - Anatomy scan: plan at 20 weeks -- scheduled 6/2/21  - Tdap: 28 weeks  - PNL: O+/ab-, RI, HIV neg, RPRnr, HepB neg, Hgb 13.1, UDS neg, GCCT/trich neg, UCx no growth               Dispo: RTC in 3 weeks for anatomy Lilly Harley MD
Temp-98. 1f infrared  Maternal emotional well being screening form completed and reviewed with patient. Current score is 6. Patient given referral to [address] [place] [address] ([phone]):  No
Clothing

## 2021-06-04 ENCOUNTER — TELEPHONE (OUTPATIENT)
Dept: OBGYN CLINIC | Age: 26
End: 2021-06-04

## 2021-06-04 NOTE — TELEPHONE ENCOUNTER
Patient called to let us know that her new OBGYN is requesting records by fax today. The records need to be sent to [facility] at [facility]. Patient is needing to be seen soon so will will wilfredo urgent for MRO. Routing to Syd Guevara.

## 2023-02-15 NOTE — ASSESSMENT & PLAN NOTE
- FWB: reassuring by janie today  - Genetic screening: [id number] sent today  - Anatomy scan: plan at 20 weeks  - Tdap: 28 weeks  - PNL: sent today, GCCT/trich neg, UCx no growth Yes

## 2024-05-20 ENCOUNTER — HOSPITAL ENCOUNTER (OUTPATIENT)
Age: 29
Setting detail: SPECIMEN
Discharge: HOME | End: 2024-05-20

## 2024-05-20 LAB
ALBUMIN SERPL-MCNC: 3.3 G/DL (ref 3.5–5.2)
ALP SERPL-CCNC: 104 U/L (ref 35–104)
ALT SERPL-CCNC: 30 U/L (ref 5–33)
ANION GAP SERPL CALCULATED.3IONS-SCNC: 14 MMOL/L (ref 9–17)
AST SERPL-CCNC: 18 U/L
BASOPHILS # BLD: 0.04 K/UL (ref 0–0.2)
BASOPHILS NFR BLD: 0 % (ref 0–2)
BILIRUB SERPL-MCNC: 0.8 MG/DL (ref 0.3–1.2)
BUN SERPL-MCNC: 9 MG/DL (ref 6–20)
BUN/CREAT SERPL: 18 (ref 9–20)
CALCIUM SERPL-MCNC: 9.2 MG/DL (ref 8.6–10.4)
CHLORIDE SERPL-SCNC: 103 MMOL/L (ref 98–107)
CO2 SERPL-SCNC: 20 MMOL/L (ref 20–31)
CREAT SERPL-MCNC: 0.5 MG/DL (ref 0.5–0.9)
EOSINOPHIL # BLD: 0.21 K/UL (ref 0–0.44)
EOSINOPHILS RELATIVE PERCENT: 2 % (ref 1–4)
ERYTHROCYTE [DISTWIDTH] IN BLOOD BY AUTOMATED COUNT: 17.1 % (ref 11.8–14.4)
GFR, ESTIMATED: >90 ML/MIN/1.73M2
GLUCOSE SERPL-MCNC: 94 MG/DL (ref 70–99)
HCT VFR BLD AUTO: 38.9 % (ref 36.3–47.1)
HGB BLD-MCNC: 12.7 G/DL (ref 11.9–15.1)
IMM GRANULOCYTES # BLD AUTO: 0.02 K/UL (ref 0–0.3)
IMM GRANULOCYTES NFR BLD: 0 %
LYMPHOCYTES NFR BLD: 1.37 K/UL (ref 1.1–3.7)
LYMPHOCYTES RELATIVE PERCENT: 13 % (ref 24–43)
MAGNESIUM SERPL-MCNC: 1.9 MG/DL (ref 1.6–2.6)
MCH RBC QN AUTO: 30.6 PG (ref 25.2–33.5)
MCHC RBC AUTO-ENTMCNC: 32.6 G/DL (ref 28.4–34.8)
MCV RBC AUTO: 93.7 FL (ref 82.6–102.9)
MONOCYTES NFR BLD: 1.21 K/UL (ref 0.1–1.2)
MONOCYTES NFR BLD: 12 % (ref 3–12)
NEUTROPHILS NFR BLD: 73 % (ref 36–65)
NEUTS SEG NFR BLD: 7.52 K/UL (ref 1.5–8.1)
NRBC BLD-RTO: 0 PER 100 WBC
PLATELET # BLD AUTO: 325 K/UL (ref 138–453)
PMV BLD AUTO: 10.5 FL (ref 8.1–13.5)
POTASSIUM SERPL-SCNC: 3.7 MMOL/L (ref 3.7–5.3)
PROT SERPL-MCNC: 7.1 G/DL (ref 6.4–8.3)
RBC # BLD AUTO: 4.15 M/UL (ref 3.95–5.11)
RBC # BLD: ABNORMAL 10*6/UL
SODIUM SERPL-SCNC: 137 MMOL/L (ref 135–144)
WBC OTHER # BLD: 10.4 K/UL (ref 3.5–11.3)

## 2024-05-20 PROCEDURE — 83735 ASSAY OF MAGNESIUM: CPT

## 2024-05-20 PROCEDURE — P9603 ONE-WAY ALLOW PRORATED MILES: HCPCS

## 2024-05-20 PROCEDURE — 36415 COLL VENOUS BLD VENIPUNCTURE: CPT

## 2024-05-20 PROCEDURE — 85025 COMPLETE CBC W/AUTO DIFF WBC: CPT

## 2024-05-20 PROCEDURE — 80053 COMPREHEN METABOLIC PANEL: CPT
